# Patient Record
Sex: MALE | Race: WHITE
[De-identification: names, ages, dates, MRNs, and addresses within clinical notes are randomized per-mention and may not be internally consistent; named-entity substitution may affect disease eponyms.]

---

## 2017-04-21 ENCOUNTER — HOSPITAL ENCOUNTER (OUTPATIENT)
Dept: HOSPITAL 39 - GT | Age: 82
Discharge: HOME | End: 2017-04-21
Attending: INTERNAL MEDICINE
Payer: MEDICARE

## 2017-04-21 DIAGNOSIS — M15.0: ICD-10-CM

## 2017-04-21 DIAGNOSIS — I50.9: Primary | ICD-10-CM

## 2017-04-21 DIAGNOSIS — I10: ICD-10-CM

## 2017-04-24 ENCOUNTER — HOSPITAL ENCOUNTER (OUTPATIENT)
Dept: HOSPITAL 39 - GT | Age: 82
End: 2017-04-24
Attending: FAMILY MEDICINE
Payer: MEDICARE

## 2017-04-24 DIAGNOSIS — I48.91: ICD-10-CM

## 2017-04-24 DIAGNOSIS — J44.1: ICD-10-CM

## 2017-04-24 DIAGNOSIS — I10: Primary | ICD-10-CM

## 2017-04-24 DIAGNOSIS — E11.9: ICD-10-CM

## 2017-06-02 ENCOUNTER — HOSPITAL ENCOUNTER (OUTPATIENT)
Dept: HOSPITAL 39 - GT | Age: 82
Discharge: HOME | End: 2017-06-02
Attending: FAMILY MEDICINE
Payer: MEDICARE

## 2017-06-02 DIAGNOSIS — D64.9: ICD-10-CM

## 2017-06-02 DIAGNOSIS — I10: ICD-10-CM

## 2017-06-02 DIAGNOSIS — D51.8: ICD-10-CM

## 2017-06-02 DIAGNOSIS — E11.9: ICD-10-CM

## 2017-06-02 DIAGNOSIS — I48.91: Primary | ICD-10-CM

## 2017-06-02 DIAGNOSIS — E56.8: ICD-10-CM

## 2017-08-02 ENCOUNTER — HOSPITAL ENCOUNTER (OUTPATIENT)
Dept: HOSPITAL 39 - GT | Age: 82
End: 2017-08-02
Attending: FAMILY MEDICINE
Payer: MEDICARE

## 2017-08-02 DIAGNOSIS — E56.9: ICD-10-CM

## 2017-08-02 DIAGNOSIS — D51.9: ICD-10-CM

## 2017-08-02 DIAGNOSIS — I10: Primary | ICD-10-CM

## 2017-08-02 DIAGNOSIS — E11.9: ICD-10-CM

## 2017-08-02 DIAGNOSIS — E56.8: ICD-10-CM

## 2017-08-02 DIAGNOSIS — Z51.81: ICD-10-CM

## 2017-09-20 ENCOUNTER — HOSPITAL ENCOUNTER (OUTPATIENT)
Dept: HOSPITAL 39 - GT | Age: 82
Discharge: HOME | End: 2017-09-20
Attending: FAMILY MEDICINE
Payer: MEDICARE

## 2017-09-20 DIAGNOSIS — I48.91: Primary | ICD-10-CM

## 2017-12-04 ENCOUNTER — HOSPITAL ENCOUNTER (OUTPATIENT)
Dept: HOSPITAL 39 - GT | Age: 82
Discharge: HOME | End: 2017-12-04
Attending: FAMILY MEDICINE
Payer: MEDICARE

## 2017-12-04 DIAGNOSIS — E11.9: ICD-10-CM

## 2017-12-04 DIAGNOSIS — E56.9: ICD-10-CM

## 2017-12-04 DIAGNOSIS — D51.8: ICD-10-CM

## 2017-12-04 DIAGNOSIS — I11.0: Primary | ICD-10-CM

## 2017-12-04 DIAGNOSIS — I50.9: ICD-10-CM

## 2017-12-11 ENCOUNTER — HOSPITAL ENCOUNTER (OUTPATIENT)
Dept: HOSPITAL 39 - GT | Age: 82
Discharge: HOME | End: 2017-12-11
Attending: FAMILY MEDICINE
Payer: MEDICARE

## 2017-12-11 DIAGNOSIS — J44.1: ICD-10-CM

## 2017-12-11 DIAGNOSIS — I10: ICD-10-CM

## 2017-12-11 DIAGNOSIS — E56.8: ICD-10-CM

## 2017-12-11 DIAGNOSIS — I48.91: Primary | ICD-10-CM

## 2017-12-11 DIAGNOSIS — B02.24: ICD-10-CM

## 2017-12-11 DIAGNOSIS — G30.9: ICD-10-CM

## 2017-12-11 DIAGNOSIS — E11.9: ICD-10-CM

## 2018-01-05 ENCOUNTER — HOSPITAL ENCOUNTER (OUTPATIENT)
Dept: HOSPITAL 39 - GT | Age: 83
Discharge: HOME | End: 2018-01-05
Attending: FAMILY MEDICINE
Payer: MEDICARE

## 2018-01-05 DIAGNOSIS — D51.8: Primary | ICD-10-CM

## 2018-01-05 DIAGNOSIS — E56.9: ICD-10-CM

## 2018-01-31 ENCOUNTER — HOSPITAL ENCOUNTER (OUTPATIENT)
Dept: HOSPITAL 39 - GT | Age: 83
End: 2018-01-31
Attending: FAMILY MEDICINE
Payer: MEDICARE

## 2018-01-31 DIAGNOSIS — J18.9: Primary | ICD-10-CM

## 2018-01-31 PROCEDURE — 85025 COMPLETE CBC W/AUTO DIFF WBC: CPT

## 2018-03-21 ENCOUNTER — HOSPITAL ENCOUNTER (OUTPATIENT)
Dept: HOSPITAL 39 - GT | Age: 83
End: 2018-03-21
Attending: FAMILY MEDICINE
Payer: MEDICARE

## 2018-03-21 DIAGNOSIS — I50.9: ICD-10-CM

## 2018-03-21 DIAGNOSIS — I48.91: ICD-10-CM

## 2018-03-21 DIAGNOSIS — I10: Primary | ICD-10-CM

## 2018-03-21 DIAGNOSIS — M10.00: ICD-10-CM

## 2018-03-21 DIAGNOSIS — E56.8: ICD-10-CM

## 2018-03-21 DIAGNOSIS — E11.9: ICD-10-CM

## 2018-03-21 DIAGNOSIS — D51.8: ICD-10-CM

## 2018-03-21 PROCEDURE — 80053 COMPREHEN METABOLIC PANEL: CPT

## 2018-03-21 PROCEDURE — 82248 BILIRUBIN DIRECT: CPT

## 2018-03-21 PROCEDURE — 80162 ASSAY OF DIGOXIN TOTAL: CPT

## 2018-03-21 PROCEDURE — 85025 COMPLETE CBC W/AUTO DIFF WBC: CPT

## 2018-03-21 PROCEDURE — 36415 COLL VENOUS BLD VENIPUNCTURE: CPT

## 2018-03-21 PROCEDURE — 83036 HEMOGLOBIN GLYCOSYLATED A1C: CPT

## 2018-03-21 PROCEDURE — 80061 LIPID PANEL: CPT

## 2018-04-24 ENCOUNTER — HOSPITAL ENCOUNTER (INPATIENT)
Dept: HOSPITAL 39 - MS | Age: 83
LOS: 3 days | Discharge: NURSING, REHAB OR LONG-TERM CARE | DRG: 191 | End: 2018-04-27
Attending: NURSE PRACTITIONER | Admitting: NURSE PRACTITIONER
Payer: MEDICARE

## 2018-04-24 DIAGNOSIS — I44.7: ICD-10-CM

## 2018-04-24 DIAGNOSIS — I48.91: ICD-10-CM

## 2018-04-24 DIAGNOSIS — Z96.651: ICD-10-CM

## 2018-04-24 DIAGNOSIS — Z88.0: ICD-10-CM

## 2018-04-24 DIAGNOSIS — E87.1: ICD-10-CM

## 2018-04-24 DIAGNOSIS — Z88.5: ICD-10-CM

## 2018-04-24 DIAGNOSIS — Z88.2: ICD-10-CM

## 2018-04-24 DIAGNOSIS — G47.30: ICD-10-CM

## 2018-04-24 DIAGNOSIS — M10.9: ICD-10-CM

## 2018-04-24 DIAGNOSIS — Z88.8: ICD-10-CM

## 2018-04-24 DIAGNOSIS — Z85.9: ICD-10-CM

## 2018-04-24 DIAGNOSIS — Z79.4: ICD-10-CM

## 2018-04-24 DIAGNOSIS — I13.10: ICD-10-CM

## 2018-04-24 DIAGNOSIS — E87.5: ICD-10-CM

## 2018-04-24 DIAGNOSIS — N17.9: ICD-10-CM

## 2018-04-24 DIAGNOSIS — Z79.84: ICD-10-CM

## 2018-04-24 DIAGNOSIS — E11.22: ICD-10-CM

## 2018-04-24 DIAGNOSIS — N18.9: ICD-10-CM

## 2018-04-24 DIAGNOSIS — J44.1: Primary | ICD-10-CM

## 2018-04-24 RX ADMIN — METFORMIN HYDROCHLORIDE SCH MG: 500 TABLET, EXTENDED RELEASE ORAL at 21:08

## 2018-04-24 RX ADMIN — INSULIN LISPRO SCH UNITS: 100 INJECTION, SOLUTION INTRAVENOUS; SUBCUTANEOUS at 16:27

## 2018-04-24 RX ADMIN — INSULIN LISPRO SCH UNITS: 100 INJECTION, SOLUTION INTRAVENOUS; SUBCUTANEOUS at 21:09

## 2018-04-24 RX ADMIN — IPRATROPIUM BROMIDE AND ALBUTEROL SULFATE SCH: .5; 3 SOLUTION RESPIRATORY (INHALATION) at 13:50

## 2018-04-24 RX ADMIN — Medication PRN MLS/HR: at 19:35

## 2018-04-24 RX ADMIN — METHYLPREDNISOLONE SODIUM SUCCINATE SCH MG: 40 INJECTION, POWDER, FOR SOLUTION INTRAMUSCULAR; INTRAVENOUS at 23:49

## 2018-04-24 RX ADMIN — INSULIN LISPRO SCH UNITS: 100 INJECTION, SOLUTION INTRAVENOUS; SUBCUTANEOUS at 12:06

## 2018-04-24 RX ADMIN — OMEPRAZOLE SCH MG: 20 CAPSULE, DELAYED RELEASE ORAL at 16:29

## 2018-04-24 RX ADMIN — METHYLPREDNISOLONE SODIUM SUCCINATE SCH MG: 40 INJECTION, POWDER, FOR SOLUTION INTRAMUSCULAR; INTRAVENOUS at 17:31

## 2018-04-24 RX ADMIN — IPRATROPIUM BROMIDE AND ALBUTEROL SULFATE SCH ML: .5; 3 SOLUTION RESPIRATORY (INHALATION) at 15:57

## 2018-04-24 RX ADMIN — IPRATROPIUM BROMIDE AND ALBUTEROL SULFATE SCH ML: .5; 3 SOLUTION RESPIRATORY (INHALATION) at 20:09

## 2018-04-24 NOTE — HP
SUPERVISING PHYSICIAN:  Danny Wood MD



CHIEF COMPLAINT: Shortness of breath.



HISTORY OF PRESENT ILLNESS: This is an 86-year-old male resident of a nursing 
home who came with shortness of breath.  Apparently they had been treating him 
for a chronic obstructive pulmonary disease exacerbation on an outpatient basis 
for a couple of weeks.  He was being treated with Zithromax and Keflex.  He 
failed to really improve and in the office today was significantly short of 
breath and unable to speak in full sentences.  Therefore, Dr. Ni contacted 
me for direct admission.  Currently, the patient is pretty short of breath at 
rest.  He is diminished with bilateral wheezing.  He has rhonchi noted as well.
  O2 saturation is about 92% on room air.  



PAST MEDICAL HISTORY: 

1.  Chronic obstructive pulmonary disease.

2.  Atrial fibrillation.

3.  Sleep apnea.

4.  Hypertension.

5.  Left bundle branch block.

6.  Cor pulmonale.

7.  Diverticulosis.

8.  Renal insufficiency.

9.  Gout.

10. Diabetes mellitus, type 2.

11. Herpes zoster.



PAST SURGICAL HISTORY:

1.  Right inguinal hernia repair.

2.  Right total knee arthroplasty.

3.  Basal cell carcinoma excision.

4.  Colonoscopy with polypectomies.  



CURRENT MEDICATIONS:

1.  Multivitamin 1 tab daily.

2.  Vitamin D 1000 units p.o. daily.

3.  Vitamin B12 1000 mcg sublingual daily.

4.  Calcium plus D 1 tab p.o. daily.

5.  Anoro Ellipta 62.5/25 1 inhalation daily.

6.  Spironolactone 25 mg daily.

7.  Lasix 40 mg at 12 noon

8.  Losartan 25 mg daily.

9.  Glipizide 10 mg p.o. b.i.d.

10. Digoxin 125 mcg p.o. daily.

11. Allopurinol 100 mg p.o. daily.

12. Omeprazole 20 mg p.o. b.i.d..

13. Lexapro 5 mg p.o. daily.

14. DuoNeb 3 mL per nebulizer q.i.d. p.r.n.

15. Hydroxyzine 1 to 2 tabs p.o. at bedtime.

16. Metformin 500 mg t.i.d.



ALLERGIES:  ATORVASTATIN, CODEINE, LISINOPRIL, MAGNESIUM, PENICILLIN, 
SIMVASTATIN, SULFA.



FAMILY HISTORY: Father  at age 94.  Mother had Alzheimer's disease.



SOCIAL HISTORY:  No alcohol, no illicit drugs, but does smoke.



REVIEW OF SYSTEMS: 

CONSTITUTIONAL: No fever or chills.  No recent weight loss or weight gain.  
Positive for malaise.

HEENT:  No headaches, vision changes, ear pain, nasal congestion or throat 
pain.  

RESPIRATORY:  Positive for cough.  No hemoptysis.  Positive for dyspnea.  No 
pleuritic chest pain.

CARDIOVASCULAR:  No chest pain, palpitations or peripheral edema.

GASTROINTESTINAL:  No nausea, vomiting, diarrhea, constipation or abdominal 
pain.

GENITOURINARY:  No dysuria, frequency or flank pain.

MUSCULOSKELETAL:  No muscle cramps, joint pain or joint swelling.

ENDOCRINE:  No polydipsia, polyuria, polyphagia.  No heat or cold intolerance.

NEUROLOGIC:  No syncope, paresthesias, seizures.  



PHYSICAL EXAMINATION: 



VITAL SIGNS:  Blood pressure 108/68.  Heart rate 96.  Respiratory rate 22.  
Temperature 98.1.  Oxygen saturation 93%.



GENERAL:  Mr. Salvador is an 86-year-old male patient in obvious respiratory 
distress currently.



HEENT:  Normocephalic, atraumatic.  Pupils are equal and reactive.  No nasal 
drainage.  Throat with dry mucosa.



NECK:  Supple.  Midline trachea.  No jugular venous distention.



CHEST:  Symmetrical with equal rise and fall of the chest with inspiration and 
expiration.  Lung sounds are diminished with bilateral wheezing and scattered 
rhonchi.



CARDIOVASCULAR:  Slightly irregular rhythm.  Normal S1, S2.



ABDOMEN:  Soft.  Positive bowel sounds.  



GENITOURINARY:  Deferred.



EXTREMITIES:  Lower extremities with no edema.  



NEUROLOGIC: The patient is alert.



LABORATORY:  White count 8.6, hemoglobin 12.5, hematocrit 37.5, platelet count 
187.  Sodium 135, potassium 4.9, chloride 101, CO2 23, BUN 44, creatinine 1.8, 
glucose 256, calcium 9.2.  .  Chest x-ray done at the clinic did not 
show any pneumonia.



ASSESSMENT:

1.  Chronic obstructive pulmonary disease exacerbation which has failed 
outpatient

     therapy.

2.  Hypertension, currently controlled.

3.  Diabetes mellitus, type 2.

4.  History of atrial fibrillation with currently controlled rate.  



PLAN:  We will utilize empiric antibiotics as well as IV steroids and nebulizer 
therapies.  He did not appear to improve well with azithromycin and Keflex p.o. 
as an outpatient.  We will utilize Lovenox for DVT prophylaxis as well as 
omeprazole for GI ulcer prophylaxis at this point as well.  If he improves over 
the next 24 hours, he may be able to go back to the nursing home tomorrow 
depending on his clinical picture at that time.  We will repeat labs as well as 
chest x-ray tomorrow as well.



#[id number]
ANNI

## 2018-04-25 RX ADMIN — METFORMIN HYDROCHLORIDE SCH: 500 TABLET, EXTENDED RELEASE ORAL at 09:29

## 2018-04-25 RX ADMIN — IPRATROPIUM BROMIDE AND ALBUTEROL SULFATE SCH ML: .5; 3 SOLUTION RESPIRATORY (INHALATION) at 13:05

## 2018-04-25 RX ADMIN — METHYLPREDNISOLONE SODIUM SUCCINATE SCH MG: 40 INJECTION, POWDER, FOR SOLUTION INTRAMUSCULAR; INTRAVENOUS at 05:32

## 2018-04-25 RX ADMIN — LOSARTAN POTASSIUM SCH MG: 25 TABLET ORAL at 08:39

## 2018-04-25 RX ADMIN — DIGOXIN SCH MG: 125 TABLET ORAL at 12:47

## 2018-04-25 RX ADMIN — OMEPRAZOLE SCH MG: 20 CAPSULE, DELAYED RELEASE ORAL at 06:24

## 2018-04-25 RX ADMIN — METHYLPREDNISOLONE SODIUM SUCCINATE SCH MG: 40 INJECTION, POWDER, FOR SOLUTION INTRAMUSCULAR; INTRAVENOUS at 12:47

## 2018-04-25 RX ADMIN — METFORMIN HYDROCHLORIDE SCH MG: 500 TABLET, EXTENDED RELEASE ORAL at 12:47

## 2018-04-25 RX ADMIN — CYANOCOBALAMIN TAB 1000 MCG SCH MCG: 1000 TAB at 08:41

## 2018-04-25 RX ADMIN — Medication PRN MLS/HR: at 16:47

## 2018-04-25 RX ADMIN — METHYLPREDNISOLONE SODIUM SUCCINATE SCH MG: 40 INJECTION, POWDER, FOR SOLUTION INTRAMUSCULAR; INTRAVENOUS at 17:00

## 2018-04-25 RX ADMIN — IPRATROPIUM BROMIDE AND ALBUTEROL SULFATE SCH ML: .5; 3 SOLUTION RESPIRATORY (INHALATION) at 00:05

## 2018-04-25 RX ADMIN — Medication SCH ML: at 21:24

## 2018-04-25 RX ADMIN — IPRATROPIUM BROMIDE AND ALBUTEROL SULFATE SCH ML: .5; 3 SOLUTION RESPIRATORY (INHALATION) at 08:35

## 2018-04-25 RX ADMIN — IPRATROPIUM BROMIDE AND ALBUTEROL SULFATE SCH ML: .5; 3 SOLUTION RESPIRATORY (INHALATION) at 05:01

## 2018-04-25 RX ADMIN — INSULIN LISPRO SCH UNITS: 100 INJECTION, SOLUTION INTRAVENOUS; SUBCUTANEOUS at 07:12

## 2018-04-25 RX ADMIN — Medication PRN MLS/HR: at 05:22

## 2018-04-25 RX ADMIN — SPIRONOLACTONE SCH MG: 25 TABLET, FILM COATED ORAL at 08:39

## 2018-04-25 RX ADMIN — CHOLECALCIFEROL TAB 50 MCG (2000 UNIT) SCH IU: 50 TAB at 08:41

## 2018-04-25 RX ADMIN — ALLOPURINOL SCH MG: 100 TABLET ORAL at 08:42

## 2018-04-25 RX ADMIN — INSULIN LISPRO SCH UNITS: 100 INJECTION, SOLUTION INTRAVENOUS; SUBCUTANEOUS at 16:52

## 2018-04-25 RX ADMIN — INSULIN LISPRO SCH UNITS: 100 INJECTION, SOLUTION INTRAVENOUS; SUBCUTANEOUS at 21:24

## 2018-04-25 RX ADMIN — OMEPRAZOLE SCH MG: 20 CAPSULE, DELAYED RELEASE ORAL at 16:50

## 2018-04-25 RX ADMIN — METFORMIN HYDROCHLORIDE SCH MG: 500 TABLET, EXTENDED RELEASE ORAL at 09:29

## 2018-04-25 RX ADMIN — LEVOFLOXACIN SCH MLS/HR: 250 INJECTION, SOLUTION INTRAVENOUS at 08:46

## 2018-04-25 RX ADMIN — CALCIUM CARBONATE-VITAMIN D TAB 500 MG-200 UNIT SCH EA: 500-200 TAB at 08:39

## 2018-04-25 RX ADMIN — INSULIN LISPRO SCH UNITS: 100 INJECTION, SOLUTION INTRAVENOUS; SUBCUTANEOUS at 11:48

## 2018-04-25 RX ADMIN — IPRATROPIUM BROMIDE AND ALBUTEROL SULFATE SCH ML: .5; 3 SOLUTION RESPIRATORY (INHALATION) at 16:15

## 2018-04-25 RX ADMIN — Medication SCH EA: at 08:39

## 2018-04-25 RX ADMIN — ESCITALOPRAM OXALATE SCH MG: 10 TABLET ORAL at 08:39

## 2018-04-25 RX ADMIN — METFORMIN HYDROCHLORIDE SCH MG: 500 TABLET, EXTENDED RELEASE ORAL at 16:50

## 2018-04-25 NOTE — RAD
Procedure:  XR CHEST 1 VIEW        



Exam Date:  4/25/2018



Ordering Provider:  Cheng Valenzuela



Clinical Indication:  copd exac



Comparison: 4/18/2018



Findings: 

Cardiomediastinal silhouette: Cardiac size magnified by

technique.

Pulmonary vasculature : Prominent centrally without taiwo edema.

Focal lung consolidation: No focal lung consolidation. Lungs are

hyperinflated.

Pleural effusion: None

Pneumothorax: None

Bones and soft tissues: Nonacute



Impression: 

1. No acute abnormalities in the chest.



Electronically signed by:  Parviz Velasco MD  4/25/2018 7:15 AM CDT

Workstation: [id number]

## 2018-04-26 RX ADMIN — Medication SCH ML: at 08:46

## 2018-04-26 RX ADMIN — METFORMIN HYDROCHLORIDE SCH MG: 500 TABLET, EXTENDED RELEASE ORAL at 07:40

## 2018-04-26 RX ADMIN — LEVALBUTEROL SCH MG: 1.25 SOLUTION RESPIRATORY (INHALATION) at 08:42

## 2018-04-26 RX ADMIN — INSULIN LISPRO SCH UNITS: 100 INJECTION, SOLUTION INTRAVENOUS; SUBCUTANEOUS at 16:56

## 2018-04-26 RX ADMIN — LEVOFLOXACIN SCH MLS/HR: 250 INJECTION, SOLUTION INTRAVENOUS at 08:41

## 2018-04-26 RX ADMIN — INSULIN LISPRO SCH UNITS: 100 INJECTION, SOLUTION INTRAVENOUS; SUBCUTANEOUS at 21:06

## 2018-04-26 RX ADMIN — INSULIN LISPRO SCH UNITS: 100 INJECTION, SOLUTION INTRAVENOUS; SUBCUTANEOUS at 11:55

## 2018-04-26 RX ADMIN — CYANOCOBALAMIN TAB 1000 MCG SCH MCG: 1000 TAB at 08:45

## 2018-04-26 RX ADMIN — OMEPRAZOLE SCH MG: 20 CAPSULE, DELAYED RELEASE ORAL at 06:30

## 2018-04-26 RX ADMIN — SPIRONOLACTONE SCH MG: 25 TABLET, FILM COATED ORAL at 08:42

## 2018-04-26 RX ADMIN — Medication SCH ML: at 21:06

## 2018-04-26 RX ADMIN — CHOLECALCIFEROL TAB 50 MCG (2000 UNIT) SCH IU: 50 TAB at 08:42

## 2018-04-26 RX ADMIN — LEVALBUTEROL SCH MG: 1.25 SOLUTION RESPIRATORY (INHALATION) at 11:52

## 2018-04-26 RX ADMIN — LEVALBUTEROL SCH MG: 1.25 SOLUTION RESPIRATORY (INHALATION) at 20:37

## 2018-04-26 RX ADMIN — OMEPRAZOLE SCH MG: 20 CAPSULE, DELAYED RELEASE ORAL at 16:59

## 2018-04-26 RX ADMIN — DIGOXIN SCH MG: 125 TABLET ORAL at 11:55

## 2018-04-26 RX ADMIN — ALLOPURINOL SCH MG: 100 TABLET ORAL at 08:45

## 2018-04-26 RX ADMIN — LEVALBUTEROL SCH MG: 1.25 SOLUTION RESPIRATORY (INHALATION) at 16:28

## 2018-04-26 RX ADMIN — Medication SCH EA: at 08:42

## 2018-04-26 RX ADMIN — ESCITALOPRAM OXALATE SCH MG: 10 TABLET ORAL at 08:41

## 2018-04-26 RX ADMIN — INSULIN LISPRO SCH UNITS: 100 INJECTION, SOLUTION INTRAVENOUS; SUBCUTANEOUS at 07:40

## 2018-04-26 RX ADMIN — LOSARTAN POTASSIUM SCH MG: 25 TABLET ORAL at 08:45

## 2018-04-26 RX ADMIN — CALCIUM CARBONATE-VITAMIN D TAB 500 MG-200 UNIT SCH EA: 500-200 TAB at 08:47

## 2018-04-26 NOTE — PN
DATE:  04/25/18



SUPERVISING PHYSICIAN:  Danny Wood M.D.



SUBJECTIVE:  The patient is sitting up in his bed in his hospital room.  He is 
eating his meal.  Complains of shortness of breath but is much improved since 
yesterday.  Denies any chest pain, nausea, vomiting, diarrhea or constipation.  
Still feels quite weak.  



OBJECTIVE:  VITAL SIGNS: He is afebrile, heart rate has been 102 to 130.  Blood 
pressure 91/52, respiratory rate 23, O2 sat has been as low as 89% on room air 
and is now 92% on room air.  RESPIRATORY: A few rhonchi scattered throughout 
all lung fields.  There is no expiratory wheezing.  Somewhat diminished at the 
bases.  CARDIAC: Tachycardic rate, regular rhythm.  GASTROINTESTINAL: Abdomen 
is soft, nondistended, non-tender.  Bowel sounds are positive.  NEUROLOGIC: He 
is awake, alert and oriented times three.



LABORATORY:  Sodium is slightly low at 134 with a slightly elevated potassium 
at 5.3, chloride 104, carbon dioxide 20, BUN 45, creatinine 1.92.  Glucose has 
run between 271 and 333.  Preliminary blood cultures show no growth after 24 
hours.  



Chest x-ray per radiology interpretation showed no acute abnormalities in the 
chest.  All other labs and films have been reviewed via the EMR.



ASSESSMENT: 

1.   Chronic obstructive pulmonary disease with acute exacerbation.  He has 
failed

      outpatient therapy.

2.   Hypertension currently controlled.

3.   Acute on chronic kidney disease.  His baseline creatinine is about 1.5.

4.  Diabetes mellitus, type 2.

5.  History of atrial fibrillation.



PLAN:  We will continue present supportive care.  Will need to closely watch 
his heart rate.  It has been elevated to the 120s today, but his family has 
said that his heart rate always goes up when he is on  IV steroids.  His IV 
steroids have been discontinued and he is now on a p.o. dose of prednisone.  I 
have discontinued his fluids.  We will have to closely watch his potassium, it 
is slightly elevated as well as his kidney functions.  I will reorder some labs 
for in the morning.  The patient has been encouraged to wear his oxygen if 
needed, although he usually does not have it on.  We will monitor him closely 
and follow as needed.  



#[id number]
[facility]

## 2018-04-26 NOTE — PN
DATE:  04/26/18



SUPERVISING PHYSICIAN:  Danny Wood M.D.



SUBJECTIVE:  The patient is lying in bed.  He is visiting with his family.  Has 
no complaints of shortness of breath, nausea, vomiting or diarrhea.  Complains 
of some difficulty speaking but he does have his appetite back.  He is rather 
hoarse.



OBJECTIVE:  He is afebrile, heart rate 73, blood pressure 107/64, respiratory 
rate 18, O2 sat 95% on room air.  RESPIRATORY:  Essentially clear to 
auscultation bilaterally.  Somewhat diminished especially at the bases.  CARDIAC
: Regular rate and rhythm.  GASTROINTESTINAL: Abdomen is soft, nondistended, non
-tender.  Bowel sounds are positive.  NEUROLOGIC: He is awake, alert and 
oriented times three.



LABORATORY:  WBCs are 11.3 with hemoglobin 11.3 and hematocrit 34.3, 
neutrophils 92.5%.  Sodium 138, potassium 5.7, chloride 108, carbon dioxide 18, 
BUN 54, creatinine 2.02.  Random glucose 280.  Preliminary blood cultures show 
no growth after 48 hours.  All other labs and films have been reviewed via the 
EMR.



ASSESSMENT: 

1.   Chronic obstructive pulmonary disease with acute exacerbation, failed 
outpatient 

      therapy.

2.   Hypertension currently controlled.

3.   Acute on chronic kidney disease.  His baseline creatinine is about 1.5.

4.   Diabetes mellitus, type 2.

5.   History of atrial fibrillation.



PLAN:  We will continue present supportive care.  His heart rate is much better 
overnight as we have decreased his steroids.  Will continue to monitor that 
closely.  His kidney function is slightly worsened today as his potassium is 
slightly higher.  Will repeat lab in the morning and monitor that closely.  
Continue to encourage good pulmonary hygiene.  I will also consult Physical 
Therapy.  Will continue to monitor the patient closely and follow as needed.  
Dr. Wood is the collaborating physician available for consultation.



#[id number]
[facility]

## 2018-04-27 VITALS — SYSTOLIC BLOOD PRESSURE: 107 MMHG | DIASTOLIC BLOOD PRESSURE: 67 MMHG | TEMPERATURE: 98.2 F

## 2018-04-27 VITALS — OXYGEN SATURATION: 91 %

## 2018-04-27 RX ADMIN — LEVALBUTEROL SCH MG: 1.25 SOLUTION RESPIRATORY (INHALATION) at 15:23

## 2018-04-27 RX ADMIN — CYANOCOBALAMIN TAB 1000 MCG SCH MCG: 1000 TAB at 08:46

## 2018-04-27 RX ADMIN — LEVOFLOXACIN SCH MLS/HR: 250 INJECTION, SOLUTION INTRAVENOUS at 08:44

## 2018-04-27 RX ADMIN — DIGOXIN SCH MG: 125 TABLET ORAL at 12:10

## 2018-04-27 RX ADMIN — LEVALBUTEROL SCH MG: 1.25 SOLUTION RESPIRATORY (INHALATION) at 11:52

## 2018-04-27 RX ADMIN — CHOLECALCIFEROL TAB 50 MCG (2000 UNIT) SCH IU: 50 TAB at 08:45

## 2018-04-27 RX ADMIN — ALLOPURINOL SCH MG: 100 TABLET ORAL at 08:44

## 2018-04-27 RX ADMIN — Medication SCH ML: at 08:48

## 2018-04-27 RX ADMIN — OMEPRAZOLE SCH MG: 20 CAPSULE, DELAYED RELEASE ORAL at 16:26

## 2018-04-27 RX ADMIN — INSULIN LISPRO SCH UNITS: 100 INJECTION, SOLUTION INTRAVENOUS; SUBCUTANEOUS at 07:42

## 2018-04-27 RX ADMIN — INSULIN LISPRO SCH UNITS: 100 INJECTION, SOLUTION INTRAVENOUS; SUBCUTANEOUS at 16:30

## 2018-04-27 RX ADMIN — OMEPRAZOLE SCH MG: 20 CAPSULE, DELAYED RELEASE ORAL at 06:25

## 2018-04-27 RX ADMIN — ESCITALOPRAM OXALATE SCH MG: 10 TABLET ORAL at 08:47

## 2018-04-27 RX ADMIN — LOSARTAN POTASSIUM SCH MG: 25 TABLET ORAL at 08:46

## 2018-04-27 RX ADMIN — LEVALBUTEROL SCH MG: 1.25 SOLUTION RESPIRATORY (INHALATION) at 08:24

## 2018-04-27 RX ADMIN — INSULIN LISPRO SCH UNITS: 100 INJECTION, SOLUTION INTRAVENOUS; SUBCUTANEOUS at 12:12

## 2018-04-27 RX ADMIN — CALCIUM CARBONATE-VITAMIN D TAB 500 MG-200 UNIT SCH EA: 500-200 TAB at 08:46

## 2018-04-27 RX ADMIN — SPIRONOLACTONE SCH MG: 25 TABLET, FILM COATED ORAL at 08:46

## 2018-04-27 RX ADMIN — Medication SCH EA: at 08:47

## 2018-04-28 NOTE — DS
SUPERVISING PHYSICIAN:  Danny Wood M.D.



DISCHARGE DIAGNOSIS: 

1.   Chronic obstructive pulmonary disease with acute exacerbation, failed 
outpatient 

      therapy.

2.   Hypertension currently controlled.

3.   Acute on chronic kidney disease.  His baseline creatinine is about 1.5.

4.   Diabetes mellitus, type 2.

5.   History of atrial fibrillation.



HISTORY OF PRESENT ILLNESS: This is an 86-year-old male patient who is a 
resident of [facility], who came in with shortness of breath.  Dr. Ni, 
his primary care physician, had been treating his COPD as an outpatient basis 
for several weeks.  He had being treated with Zithromax and Keflex.  He failed 
to improve and was a direct admission from Dr. Ni' office to the hospital.  



HOSPITAL COURSE:  On admission, he was very shortness of breath.  He was 
diminished with bilateral wheezing, has rhonchi noted as well and saturation of 
92% on room air.  He was started on IV steroids as well as Levaquin.  His IV 
steroids were titrated down fairly quickly as his family said that IV steroids 
tend to make him tachycardic.  For about 24 hours his heart rate was in the 
110s to low 130s.  After titrating his steroids down his heart rate did come 
down to around 100.  He has been on p.o. steroids for the last 24 hours and his 
pulse rate is down to the 80s.  His H&H was stable.  His WBCs did get as high 
at 11.3 but may have been due to his steroid therapy.  Today, it is 10.  He did 
have a left shift through his entire stay and today his neutrophils are 87.9%.  
He was hyponatremic several times and today his sodium is 132.  Potassium was 
slightly elevated.  It was as high as 5.7, today it is 5.3 but that may be due 
to his chronic kidney disease.  Creatinine baseline is about 1.5 and today it 
is 1.74.  His creatinine was as high as 2.02 but he received some IV fluids and 
that has improved.  His preliminary blood culture showed no growth after 3 days 
and his chest x-ray showed no acute abnormalities of the chest.  The patient is 
much improved and he will be discharged to [facility] today.  



DISCHARGE PLAN:  The patient will be discharged back to [facility] today.  
He is in good condition.  He is to increase his activity as tolerated.  He has 
a followup appointment with his primary care physician Dr. Ni, on 05/08/18 
at 8:30 AM.  He is to get a BMP on arrival due to his hyponatremia and 
hyperkalemia.  I have discharged him on Levaquin and a prednisone taper.  There 
was also a question that the patient may have been allergic to Levaquin, but he 
has tolerated his Levaquin without any issues.  He was also on long-acting 
insulin at the nursing home but his dosing was never clarified while in the 
hospital.  He will be discharged on his usual medications.  The nursing home 
called that his Levemir had inadvertently been left off of his medication list, 
but it is to be resumed at the nursing home at his previous dosing which I 
believe is 6 units of long-acting insulin daily.  I have also given him a 
routine sliding scale insulin a.c. and h.s.  If he has any further problems or 
complications, he is to return to the hospital or call Dr. Ni' office.  



DISCHARGE MEDICATIONS:

1.   Furosemide.

2.   Allopurinol.

3.   Cyanocobalamin.

4.   Multivitamins.

5.   Calcium and Vitamin D.

6.   Anoro ellipta.

7.   Spironolactone.

8.   Losartan.

9.   Glipizide.

10. Digoxin.

11. Omeprazole.

12. Lexapro.

13. DuoNeb.

14. Hydroxyzine.

15. Metformin.

16. Vitamin D.

17. Levaquin.

18. Prednisone.

19. Long-acting insulin.

20. Humalog insulin sliding scale a.c. and h.s.



Dr. Wood is the collaborating physician available for consultation.



#[id number]
[facility]

## 2018-05-02 ENCOUNTER — HOSPITAL ENCOUNTER (OUTPATIENT)
Dept: HOSPITAL 39 - GMAL | Age: 83
End: 2018-05-02
Attending: FAMILY MEDICINE
Payer: MEDICARE

## 2018-05-02 DIAGNOSIS — I50.9: ICD-10-CM

## 2018-05-02 DIAGNOSIS — I10: Primary | ICD-10-CM

## 2018-05-07 ENCOUNTER — HOSPITAL ENCOUNTER (OUTPATIENT)
Dept: HOSPITAL 39 - GT | Age: 83
End: 2018-05-07
Attending: FAMILY MEDICINE
Payer: MEDICARE

## 2018-05-07 DIAGNOSIS — I50.9: ICD-10-CM

## 2018-05-07 DIAGNOSIS — I10: ICD-10-CM

## 2018-05-07 DIAGNOSIS — E11.9: Primary | ICD-10-CM

## 2018-10-02 ENCOUNTER — HOSPITAL ENCOUNTER (OUTPATIENT)
Dept: HOSPITAL 39 - GT | Age: 83
End: 2018-10-02
Attending: PHYSICIAN ASSISTANT
Payer: MEDICARE

## 2018-10-02 DIAGNOSIS — L02.92: Primary | ICD-10-CM

## 2019-01-09 ENCOUNTER — HOSPITAL ENCOUNTER (INPATIENT)
Dept: HOSPITAL 39 - ER | Age: 84
LOS: 4 days | DRG: 871 | End: 2019-01-13
Attending: NURSE PRACTITIONER | Admitting: NURSE PRACTITIONER
Payer: MEDICARE

## 2019-01-09 DIAGNOSIS — Z88.0: ICD-10-CM
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
## 2019-01-09 DIAGNOSIS — Z79.84: ICD-10-CM

## 2019-01-09 DIAGNOSIS — Z88.2: ICD-10-CM

## 2019-01-09 DIAGNOSIS — Z88.8: ICD-10-CM

## 2019-01-09 DIAGNOSIS — Z88.5: ICD-10-CM

## 2019-01-09 DIAGNOSIS — J18.9: ICD-10-CM

## 2019-01-09 DIAGNOSIS — N17.9: ICD-10-CM

## 2019-01-09 DIAGNOSIS — G47.33: ICD-10-CM

## 2019-01-09 DIAGNOSIS — Z66: ICD-10-CM

## 2019-01-09 DIAGNOSIS — Z96.651: ICD-10-CM

## 2019-01-09 DIAGNOSIS — N18.9: ICD-10-CM

## 2019-01-09 DIAGNOSIS — J44.1: ICD-10-CM

## 2019-01-09 DIAGNOSIS — B37.2: ICD-10-CM

## 2019-01-09 DIAGNOSIS — I48.91: ICD-10-CM

## 2019-01-09 DIAGNOSIS — M1A.9XX0: ICD-10-CM

## 2019-01-09 DIAGNOSIS — E87.5: ICD-10-CM

## 2019-01-09 DIAGNOSIS — I12.9: ICD-10-CM

## 2019-01-09 DIAGNOSIS — E87.1: ICD-10-CM

## 2019-01-09 DIAGNOSIS — E11.22: ICD-10-CM

## 2019-01-09 DIAGNOSIS — A41.9: Primary | ICD-10-CM

## 2019-01-09 DIAGNOSIS — Z87.891: ICD-10-CM

## 2019-01-09 DIAGNOSIS — J44.0: ICD-10-CM

## 2019-01-09 DIAGNOSIS — R65.20: ICD-10-CM

## 2019-01-09 DIAGNOSIS — E86.0: ICD-10-CM

## 2019-01-09 RX ADMIN — IPRATROPIUM BROMIDE AND ALBUTEROL SULFATE SCH ML: .5; 3 SOLUTION RESPIRATORY (INHALATION) at 20:41

## 2019-01-09 RX ADMIN — Medication PRN MLS/HR: at 14:48

## 2019-01-09 RX ADMIN — IPRATROPIUM BROMIDE AND ALBUTEROL SULFATE SCH ML: .5; 3 SOLUTION RESPIRATORY (INHALATION) at 16:05

## 2019-01-09 RX ADMIN — NYSTATIN SCH APPLIC: 100000 POWDER TOPICAL at 20:51

## 2019-01-09 RX ADMIN — SODIUM CHLORIDE SCH MLS/HR: 9 INJECTION, SOLUTION INTRAVENOUS at 23:15

## 2019-01-09 RX ADMIN — NYSTATIN SCH: 100000 POWDER TOPICAL at 20:51

## 2019-01-09 RX ADMIN — INSULIN LISPRO SCH UNITS: 100 INJECTION, SOLUTION INTRAVENOUS; SUBCUTANEOUS at 20:50

## 2019-01-09 RX ADMIN — ENOXAPARIN SODIUM SCH MG: 30 INJECTION, SOLUTION INTRAVENOUS; SUBCUTANEOUS at 20:50

## 2019-01-09 NOTE — ED.PDOC
History of Present Illness





- General


Chief Complaint: Respiratory Problem


Time Seen by Provider: 19 09:31


Source: patient


Exam Limitations: no limitations


Additional Information: 





PT PRESENTS AFTER FALL LAST PM. DID HIT HEAD NO LOC. REFUSED TO COME TO ER. PT 

HAS HAD PROGRESSIVE WEAKNESS, COUGH, AND LOW GRADE FEVER MUCH WORSE SINCE 

YESTERDAY. 





- History of Present Illness


Timing/Duration: unsure


Severity: moderate


Improving Factors: nothing


Worsening Factors: nothing


Associated Symptoms: cough


Allergies/Adverse Reactions: 


Allergies





Atorvastatin [From Lipitor] Allergy (Verified 18 11:10)


   


Codeine Allergy (Verified 18 11:10)


   


Lisinopril Allergy (Verified 18 11:10)


   


Magnesium Sulfate Allergy (Verified 18 11:10)


   


Penicillins Allergy (Verified 18 11:10)


   


Simvastatin [From Zocor] Allergy (Verified 18 11:10)


   


Sulfa Antibiotics Allergy (Verified 18 11:10)


   








Home Medications: 


Ambulatory Orders





Allopurinol 100 mg PO DAILY 04/15/14 


Furosemide [Lasix] 40 mg PO DAILY@1200 04/15/14 


Calcium Citrate-Vitamin D [Calcium Citrate + D 315-200 mg-Unit] 1 tab PO DAILY 

18 


Cholecalciferol [Vitamin D] 1,000 unit PO DAILY 18 


Cyanocobalamin [Vitamin B-12] 1,000 mcg SL DAILY 18 


Digoxin [Digox] 125 mcg PO DAILY 18 


Escitalopram Oxalate [Lexapro] 5 mg PO DAILY 18 


Glipizide 10 mg PO BID 18 


Hydroxyzine HCl 1 - 2 tablet PO BEDTIME 18 


Ipratropium/Albuterol [Duoneb] 3 ml NEB QID PRN 18 


Losartan Potassium [Cozaar] 25 mg PO DAILY 18 


Metformin HCl [Metformin HCl ER] 500 mg PO TID 18 


Multiple Vitamins W/ Minerals [Abdek] 1 cap PO DAILY 18 


Omeprazole 20 mg PO BID 18 


Spironolactone [Aldactone] 25 mg PO DAILY 18 


Umeclidinium-Vilanterol [Anoro Ellipta 62.5-25 Mcg/INH] 1 aer IN DAILY 18 


Levofloxacin [Levaquin] 250 mg PO DAILY #6 tablet 18 


Prednisone See Taper PO DAILY #30 tab 18 











Review of Systems





- Review of Systems


Constitutional: States: fever.  Denies: chills


EENTM: States: no symptoms reported


Respiratory: States: cough, short of breath


Cardiology: Denies: chest pain, palpitations


Gastrointestinal/Abdominal: Denies: abdominal pain, nausea, vomiting


Genitourinary: States: no symptoms reported


Musculoskeletal: States: no symptoms reported


Skin: States: no symptoms reported


Neurological: Denies: paresthesia, weakness


Endocrine: States: no symptoms reported


Hematologic/Lymphatic: States: no symptoms reported





Past Medical History (General)





- Patient Medical History


Hx Seizures: No


Hx Stroke: No


Hx Asthma: No


Hx of COPD: Yes


Hx Cardiac Disorders: Yes - A-Fib


Hx Congestive Heart Failure: No


Hx Pacemaker: No


Hx Hypertension: Yes


Hx Diabetes: Yes


Hx Cancer: No


Hx MRSA: No





- Vaccination History


Hx Tetanus, Diphtheria Vaccination: No


Hx Influenza Vaccination: Yes


Hx Pneumococcal Vaccination: Yes





- Social History


Hx Tobacco Use: Yes


Hx Alcohol Use: No


Hx Substance Use: No


Hx Physical Abuse: No


Hx Emotional Abuse: No





- Triage Comment


ED Triage Comment: EMS states that patient has breathing difficulty and 

shortness of breath that started last night. Refused medications and transport 

to hospital last night. Patient fell at nursing home. Patient is running a temp 

and has productive cough.





Family Medical History





- Family History


  ** Father


Family History: No Known


Living Status: 





Physical Exam





- Physical Exam


General Appearance: Alert, Other - MILD RESP DISTRESS


Eye Exam: bilateral normal


Ears, Nose, Throat: hearing grossly normal, normal ENT inspection, other - DRY 

MUCOUS MEMBRANES


Neck: non-tender, full range of motion, supple


Respiratory: normal breath sounds, other - FEW SCATTERED RALES, NO WHEEZES, NO 

RHONCHI


Cardiovascular/Chest: tachycardia, irregularly irregular


Gastrointestinal/Abdominal: non tender, soft, no organomegaly


Back Exam: normal inspection, no CVA tenderness


Extremity: normal range of motion, non-tender, no pedal edema


Neurologic: no motor/sensory deficits, alert, oriented x 3


Skin Exam: normal color


Lymphatic: no adenopathy





Progress





- EKG/XRAY/CT


EKG: Atrial, Fibrillation - VENTRICULAR RATE 149, LAD, , nonspecific ST T wave 

Chg - NAIP, , Unchanged from - 18 EXCEPT FOR RATE





Departure





- Departure


Disposition: Discharge to Home or Self Care


Departure Forms:  ED Discharge - Pt. Copy, Patient Portal Self Enrollment


Referrals: 


Desmond Ni III, MD [Primary Care Provider] - 1-2 Weeks


Home Medications: 


Ambulatory Orders





Allopurinol 100 mg PO DAILY 04/15/14 


Furosemide [Lasix] 40 mg PO DAILY@1200 04/15/14 


Calcium Citrate-Vitamin D [Calcium Citrate + D 315-200 mg-Unit] 1 tab PO DAILY 

18 


Cholecalciferol [Vitamin D] 1,000 unit PO DAILY 18 


Cyanocobalamin [Vitamin B-12] 1,000 mcg SL DAILY 18 


Digoxin [Digox] 125 mcg PO DAILY 18 


Escitalopram Oxalate [Lexapro] 5 mg PO DAILY 18 


Glipizide 10 mg PO BID 18 


Hydroxyzine HCl 1 - 2 tablet PO BEDTIME 18 


Ipratropium/Albuterol [Duoneb] 3 ml NEB QID PRN 18 


Losartan Potassium [Cozaar] 25 mg PO DAILY 18 


Metformin HCl [Metformin HCl ER] 500 mg PO TID 18 


Multiple Vitamins W/ Minerals [Abdek] 1 cap PO DAILY 18 


Omeprazole 20 mg PO BID 18 


Spironolactone [Aldactone] 25 mg PO DAILY 18 


Umeclidinium-Vilanterol [Anoro Ellipta 62.5-25 Mcg/INH] 1 aer IN DAILY 18 


Levofloxacin [Levaquin] 250 mg PO DAILY #6 tablet 18 


Prednisone See Taper PO DAILY #30 tab 18

## 2019-01-09 NOTE — RAD
EXAM DESCRIPTION: 



Chest,1 View



CLINICAL HISTORY: 



Cough and shortness of breath 



FINDINGS/ IMPRESSION: 



Comparison 4/25/2018



Interval development of interstitial infiltrate left upper lobe.

Subtle fullness possible slight retraction of the left hilum

superiorly. Recommend chest CT to exclude underlying obstructive

mass



Heart size is normal. Prominent central vasculature/pulmonary

artery on the right. Perihilar peribronchial thickening may

relate to chronic bronchitis



Electronically signed by:  Bandar Higgins MD  1/9/2019 10:11 AM

[facility] Workstation: [id number]

## 2019-01-09 NOTE — HP
SUPERVISING PHYSICIAN:  Danny Wood M.D.



CHIEF COMPLAINT:  Shortness of breath and coughing.



HISTORY OF PRESENT ILLNESS:  This is an 87 year-old male patient who lives at 
[facility].  He has had about 3 to 4 days of coughing, low-
grade fever and progressive weakness.  He actually fell yesterday but did not 
hit his head and had no loss of consciousness, but he refused to come to the E. 
R.  Due to the coughing and low-grade fever he did come this morning.  His 
temperature was 100.1 with a pulse rate of 141, respiratory rate of 36, blood 
pressure 114/57 and O2 sat was 90% on 3 liters nasal cannula.  Lab was done and 
he was found to have a sodium of 132 with potassium 5.7, chloride 100, carbon 
dioxide 23, BUN 38, creatinine 1.55.  His baseline creatinine is about 1.4 to 
1.5.  His glucose was elevated at 198.  He had a BNP of 460.  Total bilirubin 
was 1.6 and lactic acid was 3.1.  Troponin was slightly elevated at 0.06.  WBCs 
were 14.4 with hemoglobin 10.1, hematocrit 30.8.  He did have a left shift on 
his differential.  UA was basically within normal limits with a small amount of 
urine leukocyte esterase and trace of intact urine blood.  Blood cultures were 
drawn and an x-ray was also done.  Chest x-ray shows interval development of 
interstitial infiltrate in the left upper lobe with subtle fullness possible 
slight retraction to the left hilum superiorly.  Recommend chest CT to exclude 
underlying obstructive mass.  Heart size is normal.  Prominent central 
vasculature/pulmonary artery on the right, perihilar/peribronchial thickening 
may relate to chronic bronchitis.  Head CT was also done which showed no 
intracranial hemorrhage or evidence of cerebral contusion.  Senescent brain.  
White matter disease and volume loss.  Fluid in the left maxillary sinus 
partially visualized, may relate to sinusitis or trauma.  He was given some 
Cefepime in the Emergency Room as well as approximately 2 liters of fluid.  He 
was given several breathing treatments.  I was called for hospital admission.



PAST MEDICAL HISTORY: 

1.   Chronic obstructive pulmonary disease.

2.   Atrial fibrillation on Digoxin.

3.   Sleep apnea.

4.   Hypertension.

5.   Left bundle branch block.

6.   Cor pulmonale.

7.   Diverticulosis.

8.   Renal insufficiency.

9.   Gout.

10. Diabetes mellitus type 2.

11. Herpes zoster.



PAST SURGICAL HISTORY:

1.   Right inguinal hernia repair.

2.   Right total knee arthroplasty.

3.   Basal cell carcinoma excision.

4.   Colonoscopy with polypectomies.



CODE STATUS:  Do not resuscitate. 



CURRENT MEDICATIONS:  Per the EMR and awaiting verification.



ALLERGIES:  ATORVASTATIN, CODEINE, LISINOPRIL, MAGNESIUM, PENICILLIN, 
SIMVASTATIN AND SULFA.



FAMILY HISTORY:  Positive for Alzheimer's disease.



SOCIAL HISTORY:  He lives at [facility].  He does have a history of smoking 
but has quit since he has been in the nursing home.  There is no history of 
alcohol or illicit drug use.



REVIEW OF SYSTEMS: 

GENERAL:  Positive for fever and chills.  Negative for weight changes.

HEENT:  Negative for headaches, vision changes, ear pain or sore throat.

RESPIRATORY:  As per History of Present Illness.  Plus his wife said that he was
coughing up some pink-tinged sputum several weeks ago.

CARDIOVASCULAR:  Negative for chest pains, palpitations or tachycardia.

GASTROINTESTINAL:  Negative for nausea, vomiting, constipation or diarrhea.

GENITOURINARY:  Negative for dysuria, polyuria or hematuria.

MUSCULOSKELETAL:  Negative for arthralgias or myalgias.

NEUROLOGIC:  Negative for headaches, seizures or dizziness.



PHYSICAL EXAMINATION: 



VITAL SIGNS:  Temperature 100.4, heart rate 141, blood pressure 101/57, 
respiratory rate 23, O2 sat 93% on room air.



GENERAL: This is an 87 year-old male patient who is lying in his hospital bed.  
He looks to be moderately ill.



HEENT:  Normocephalic and atraumatic. Pupils are equal and reactive.  Oropharynx
is clear.



NECK:  Supple without mass.



RESPIRATORY:  Diminished breath sounds throughout with some wheezing in the 
apices and a few scattered rhonchi.



CHEST:  There is equal rise and fall of the chest with inspiration and 
expiration.



CARDIOVASCULAR:  Tachycardic rate, irregular rhythm.  Atrial fibrillation on the
cardiac monitor.



ABDOMEN:  Soft, nondistended, non-tender.  Bowel sounds are positive.  



EXTREMITIES:  No clubbing, cyanosis or edema.



NEUROLOGIC:  He is awake and alert to person and place only.



LABORATORY:  Labs and films are as per the History of Present Illness.



ASSESSMENT:  

1.   Sepsis secondary to bilateral pneumonia, most likely healthcare associated

      pneumonia with an admitting heart rate of greater than 140, respiratory 
rate 

      of 36, WBCs of 14,400, temperature of 100.4 and lactic acid of 3.1.

2.   Electrolyte imbalance most likely hyponatremia, hypochloridemia and

      hyperkalemia.

3.   Elevated bilirubin.

4.   Inguinal yeast infection.

5.   Diabetes mellitus type 2.

6.   Atrial fibrillation on Digoxin.

7.   Chronic obstructive pulmonary disease.

8.   Obstructive sleep apnea.

9.   Chronic renal insufficiency.

10. Gout on Allopurinol.

11. Hypertension on Losartan.



PLAN:  We will admit the patient to the hospital.  I have initiated the 
pneumonia guidelines.  Will continue him on Cefepime and monitor his cultures as
they become available.  I have rechecked his lactic acid and will give him 
judicious fluids overnight.  I have ordered Diflucan as a one time dose for the 
yeast infection and will also give him Nystatin powder.  He is also on sliding 
scale insulin with blood sugars to be checked a.c. and h.s.  I will restart his 
home medications as soon as they are verified.  He will get good aggressive 
pulmonary hygiene.  I have also given him Lovenox for DVT prophylaxis as well as
a PPI for ulcer prophylaxis.  We will continue to monitor him closely and 
followup as needed.  Dr. Wood is the collaborating physician available for 
consultation.



#[id number]

[facility]

## 2019-01-09 NOTE — CT
EXAM DESCRIPTION: 



CT head without contrast



CLINICAL HISTORY: 



Fall injury. Headache





COMPARISON: 



None.



TECHNIQUE: 



Noncontrast spiral CT of the brain. This exam was performed

according to our departmental dose-optimization program, which

includes automated exposure control, adjustment of the mA and/or

kV according to patient size and/or use of iterative

reconstruction technique



FINDINGS: 



Motion degraded study limits evaluation of facial bones and skull

base. No calvarial or skull base fracture identified. No fluid in

the mastoid air cells. Fluid in the partially visualized left

maxillary sinus which could be sinusitis or trauma related



Cerebral volume loss with prominence of the cortical sulci most

significantly affecting the frontal lobes. Prominent ventricular

system likely central volume loss. No suspicion of obstructive

hydrocephalus. Moderate white matter disease, nonspecific likely

remote microvascular ischemia



No intracranial hemorrhage or mass lesion. Atherosclerotic

vascular calcifications of the cavernous internal carotid

arteries



IMPRESSION: 



No intracranial hemorrhage or evidence of cerebral contusion.

Senescent brain. White matter disease and volume loss



Fluid in the left maxillary sinus, partially visualized may

relate to sinusitis or trauma



CT is insensitive for early evaluation of acute stroke. If there

is clinical concern for acute ischemia, an MRI may be considered.



Electronically signed by:  Bandar Higgins MD  1/9/2019 10:19 AM

[facility] Workstation: [id number]

## 2019-01-10 RX ADMIN — SPIRONOLACTONE SCH MG: 25 TABLET, FILM COATED ORAL at 09:53

## 2019-01-10 RX ADMIN — INSULIN LISPRO SCH UNITS: 100 INJECTION, SOLUTION INTRAVENOUS; SUBCUTANEOUS at 12:40

## 2019-01-10 RX ADMIN — IPRATROPIUM BROMIDE AND ALBUTEROL SULFATE SCH ML: .5; 3 SOLUTION RESPIRATORY (INHALATION) at 07:10

## 2019-01-10 RX ADMIN — NYSTATIN SCH: 100000 POWDER TOPICAL at 09:00

## 2019-01-10 RX ADMIN — NYSTATIN SCH APPLIC: 100000 POWDER TOPICAL at 13:40

## 2019-01-10 RX ADMIN — DILTIAZEM HYDROCHLORIDE SCH MG: 30 TABLET, FILM COATED ORAL at 18:43

## 2019-01-10 RX ADMIN — Medication PRN MLS/HR: at 03:31

## 2019-01-10 RX ADMIN — SODIUM CHLORIDE SCH MLS/HR: 9 INJECTION, SOLUTION INTRAVENOUS at 11:36

## 2019-01-10 RX ADMIN — ISODIUM CHLORIDE PRN MG: 0.03 SOLUTION RESPIRATORY (INHALATION) at 04:03

## 2019-01-10 RX ADMIN — Medication PRN MLS/HR: at 17:36

## 2019-01-10 RX ADMIN — PANTOPRAZOLE SODIUM SCH MG: 40 INJECTION, POWDER, FOR SOLUTION INTRAVENOUS at 06:01

## 2019-01-10 RX ADMIN — METFORMIN HYDROCHLORIDE SCH MG: 500 TABLET, EXTENDED RELEASE ORAL at 21:06

## 2019-01-10 RX ADMIN — NYSTATIN SCH APPLIC: 100000 POWDER TOPICAL at 09:55

## 2019-01-10 RX ADMIN — ALLOPURINOL SCH MG: 100 TABLET ORAL at 09:51

## 2019-01-10 RX ADMIN — IPRATROPIUM BROMIDE AND ALBUTEROL SULFATE SCH ML: .5; 3 SOLUTION RESPIRATORY (INHALATION) at 11:22

## 2019-01-10 RX ADMIN — NYSTATIN SCH APPLIC: 100000 POWDER TOPICAL at 17:10

## 2019-01-10 RX ADMIN — DILTIAZEM HYDROCHLORIDE SCH MG: 30 TABLET, FILM COATED ORAL at 06:01

## 2019-01-10 RX ADMIN — ISODIUM CHLORIDE PRN MG: 0.03 SOLUTION RESPIRATORY (INHALATION) at 00:06

## 2019-01-10 RX ADMIN — IPRATROPIUM BROMIDE AND ALBUTEROL SULFATE SCH: .5; 3 SOLUTION RESPIRATORY (INHALATION) at 19:34

## 2019-01-10 RX ADMIN — SODIUM CHLORIDE SCH MLS/HR: 9 INJECTION, SOLUTION INTRAVENOUS at 23:12

## 2019-01-10 RX ADMIN — INSULIN LISPRO SCH UNITS: 100 INJECTION, SOLUTION INTRAVENOUS; SUBCUTANEOUS at 21:06

## 2019-01-10 RX ADMIN — INSULIN LISPRO SCH UNITS: 100 INJECTION, SOLUTION INTRAVENOUS; SUBCUTANEOUS at 17:09

## 2019-01-10 RX ADMIN — IPRATROPIUM BROMIDE AND ALBUTEROL SULFATE SCH ML: .5; 3 SOLUTION RESPIRATORY (INHALATION) at 15:53

## 2019-01-10 RX ADMIN — NYSTATIN SCH APPLIC: 100000 POWDER TOPICAL at 21:06

## 2019-01-10 RX ADMIN — FUROSEMIDE SCH MG: 40 TABLET ORAL at 09:53

## 2019-01-10 RX ADMIN — INSULIN LISPRO SCH UNITS: 100 INJECTION, SOLUTION INTRAVENOUS; SUBCUTANEOUS at 07:56

## 2019-01-10 RX ADMIN — METFORMIN HYDROCHLORIDE SCH MG: 500 TABLET, EXTENDED RELEASE ORAL at 09:53

## 2019-01-10 RX ADMIN — DILTIAZEM HYDROCHLORIDE SCH MG: 30 TABLET, FILM COATED ORAL at 12:48

## 2019-01-10 RX ADMIN — ESCITALOPRAM OXALATE SCH MG: 10 TABLET ORAL at 09:52

## 2019-01-10 RX ADMIN — METFORMIN HYDROCHLORIDE SCH MG: 500 TABLET, EXTENDED RELEASE ORAL at 15:23

## 2019-01-10 RX ADMIN — DILTIAZEM HYDROCHLORIDE SCH MG: 30 TABLET, FILM COATED ORAL at 00:05

## 2019-01-10 RX ADMIN — POLYETHYLENE GLYCOL 3350 SCH GM: 17 POWDER, FOR SOLUTION ORAL at 17:10

## 2019-01-10 RX ADMIN — ENOXAPARIN SODIUM SCH MG: 30 INJECTION, SOLUTION INTRAVENOUS; SUBCUTANEOUS at 21:06

## 2019-01-10 RX ADMIN — LOSARTAN POTASSIUM SCH MG: 25 TABLET ORAL at 09:53

## 2019-01-10 RX ADMIN — DIGOXIN SCH MG: 125 TABLET ORAL at 09:52

## 2019-01-10 NOTE — RAD
CHEST  1/10/2019



CLINICAL HISTORY:  Pneumonia.



COMPARISON: Chest 1/9/2019.



TECHNIQUE: AP Chest.



FINDINGS: 



There are new dense airspace opacities throughout the left upper

lobe consistent with pneumonia. There is bilateral lower lobe

atelectasis. No pneumothorax or large pleural effusion. Left

costophrenic angle is excluded from the field of imaging.



Cardiac size is enlarged. There is no pulmonary edema. There is

no pulmonary vascular congestion. Lungs are mildly hyperinflated.



IMPRESSION:



1. Significant left upper lobe pneumonia. Bilateral lower lobe

atelectasis.

2. Hyperinflation.



Electronically signed by:  Rubina Liz DO  1/10/2019 7:13 AM CST

Workstation: [id number]

## 2019-01-10 NOTE — PN
SUPERVISING PHYSICIAN:  Danny Wood MD



DATE:  01/10/19



SUBJECTIVE:  The patient is lying in his bed.  He is asleep.  He awakens easily.
 He has no complaints.  He denies shortness of breath, nausea or vomiting.  He 
answers simple yes/no questions appropriately.  There is no family at the 
bedside at this time.  



OBJECTIVE:  

VITAL SIGNS:  Temperature 97.8.  Heart rate 83.  Blood pressure 97/49.  
Respiratory rate 22.  O2 saturation 93% on high-flow cannula.  

RESPIRATORY:  Coarse rhonchi on the right upper and lower lobes.  A few 
scattered expiratory wheezes in the apices.  Diminished breath sounds 
throughout.  He is slightly tachypneic at times.  

GASTROINTESTINAL: Abdomen is soft, nondistended, nontender.  Bowel sounds are 
positive.  

NEUROLOGIC: Awake, alert and oriented to person and place only. 



LABORATORY:  WBCs have normalized to 10.6, but he still has a left shift on 
differential.  Hemoglobin 9.2, hematocrit 28.6.  Blood sugars have run between 
162 and 310.  Sodium 135, potassium improved slightly to 5.2, chloride 106, BUN 
37, creatinine slightly improved to 1.48.  Bilirubin 1.3.  Lactic acid from 
yesterday after fluid was 1.6.  Digoxin 1.3.  Preliminary blood cultures show no
growth after 24 hours.  Urine cultures pending.  Chest x-ray shows significant 
left upper lobe pneumonia, bilateral lower lobe atelectasis and hyperinflation. 
All other labs and films have been reviewed via the EMR.  



ASSESSMENT: 

1.   Sepsis secondary to bilateral pneumonia, most likely healthcare associated

      pneumonia with an admitting heart rate of greater than 140, respiratory 
rate 

      of 36, WBCs of 14,400, temperature of 100.4 and lactic acid of 3.1.

2.   Atrial fibrillation on digoxin with accelerated heart rates up to the 140s.

3.   Electrolyte imbalance, improved.

4.   Elevated bilirubin, improved.

5.   Inguinal yeast infection.

6.   Diabetes mellitus, type 2.

7.   Chronic obstructive pulmonary disease.

8.   Obstructive sleep apnea.

9.   Chronic renal insufficiency.

10. Gout on allopurinol.

11. Hypertension on losartan.



PLAN:  We will continue present supportive care.  We will monitor his cultures 
as they become available.  Continue on the cefepime for right now.  Yesterday, 
he required some Cardizem because his heart rate was staying in the 130s to 140s
and I put him on some p.o. Cardizem 30 mg every 6 hours.  So far, his heart rate
has stayed in the 80s and 90s.  He is continuing on his digoxin, but may need 
the Cardizem on discharge.  I have repeated his labs tomorrow.  He is on sliding
scale insulin with a.c. and h.s. blood sugar checks.  Aggressive pulmonary 
hygiene.  We will continue to monitor the patient closely and follow as needed. 
Dr. Wood is the collaborating physician and available for consultation.  



#[id number]

[facility]

## 2019-01-11 RX ADMIN — METFORMIN HYDROCHLORIDE SCH MG: 500 TABLET, EXTENDED RELEASE ORAL at 15:37

## 2019-01-11 RX ADMIN — IPRATROPIUM BROMIDE AND ALBUTEROL SULFATE SCH ML: .5; 3 SOLUTION RESPIRATORY (INHALATION) at 12:29

## 2019-01-11 RX ADMIN — PANTOPRAZOLE SODIUM SCH MG: 40 INJECTION, POWDER, FOR SOLUTION INTRAVENOUS at 06:12

## 2019-01-11 RX ADMIN — INSULIN LISPRO SCH UNITS: 100 INJECTION, SOLUTION INTRAVENOUS; SUBCUTANEOUS at 11:43

## 2019-01-11 RX ADMIN — METFORMIN HYDROCHLORIDE SCH MG: 500 TABLET, EXTENDED RELEASE ORAL at 08:03

## 2019-01-11 RX ADMIN — INSULIN LISPRO SCH UNITS: 100 INJECTION, SOLUTION INTRAVENOUS; SUBCUTANEOUS at 17:00

## 2019-01-11 RX ADMIN — FUROSEMIDE SCH MG: 40 TABLET ORAL at 08:03

## 2019-01-11 RX ADMIN — ENOXAPARIN SODIUM SCH MG: 30 INJECTION, SOLUTION INTRAVENOUS; SUBCUTANEOUS at 20:54

## 2019-01-11 RX ADMIN — DILTIAZEM HYDROCHLORIDE SCH MG: 30 TABLET, FILM COATED ORAL at 17:43

## 2019-01-11 RX ADMIN — ISODIUM CHLORIDE PRN MG: 0.03 SOLUTION RESPIRATORY (INHALATION) at 02:30

## 2019-01-11 RX ADMIN — Medication PRN MLS/HR: at 06:12

## 2019-01-11 RX ADMIN — IPRATROPIUM BROMIDE AND ALBUTEROL SULFATE SCH ML: .5; 3 SOLUTION RESPIRATORY (INHALATION) at 20:09

## 2019-01-11 RX ADMIN — DILTIAZEM HYDROCHLORIDE SCH MG: 30 TABLET, FILM COATED ORAL at 12:00

## 2019-01-11 RX ADMIN — SODIUM CHLORIDE SCH MLS/HR: 9 INJECTION, SOLUTION INTRAVENOUS at 10:41

## 2019-01-11 RX ADMIN — ALLOPURINOL SCH MG: 100 TABLET ORAL at 08:04

## 2019-01-11 RX ADMIN — DILTIAZEM HYDROCHLORIDE SCH MG: 30 TABLET, FILM COATED ORAL at 00:18

## 2019-01-11 RX ADMIN — NYSTATIN SCH APPLIC: 100000 POWDER TOPICAL at 08:05

## 2019-01-11 RX ADMIN — SPIRONOLACTONE SCH MG: 25 TABLET, FILM COATED ORAL at 08:03

## 2019-01-11 RX ADMIN — DILTIAZEM HYDROCHLORIDE SCH MG: 30 TABLET, FILM COATED ORAL at 06:12

## 2019-01-11 RX ADMIN — IPRATROPIUM BROMIDE AND ALBUTEROL SULFATE SCH ML: .5; 3 SOLUTION RESPIRATORY (INHALATION) at 16:29

## 2019-01-11 RX ADMIN — NYSTATIN SCH APPLIC: 100000 POWDER TOPICAL at 17:01

## 2019-01-11 RX ADMIN — NYSTATIN SCH APPLIC: 100000 POWDER TOPICAL at 20:54

## 2019-01-11 RX ADMIN — ESCITALOPRAM OXALATE SCH MG: 10 TABLET ORAL at 08:04

## 2019-01-11 RX ADMIN — METFORMIN HYDROCHLORIDE SCH MG: 500 TABLET, EXTENDED RELEASE ORAL at 20:54

## 2019-01-11 RX ADMIN — LOSARTAN POTASSIUM SCH MG: 25 TABLET ORAL at 08:04

## 2019-01-11 RX ADMIN — IPRATROPIUM BROMIDE AND ALBUTEROL SULFATE SCH ML: .5; 3 SOLUTION RESPIRATORY (INHALATION) at 08:43

## 2019-01-11 RX ADMIN — Medication PRN ML: at 13:41

## 2019-01-11 RX ADMIN — INSULIN LISPRO SCH UNITS: 100 INJECTION, SOLUTION INTRAVENOUS; SUBCUTANEOUS at 08:00

## 2019-01-11 RX ADMIN — POLYETHYLENE GLYCOL 3350 SCH: 17 POWDER, FOR SOLUTION ORAL at 08:04

## 2019-01-11 RX ADMIN — INSULIN LISPRO SCH UNITS: 100 INJECTION, SOLUTION INTRAVENOUS; SUBCUTANEOUS at 21:39

## 2019-01-11 RX ADMIN — NYSTATIN SCH APPLIC: 100000 POWDER TOPICAL at 12:56

## 2019-01-11 RX ADMIN — MORPHINE SULFATE PRN MG: 10 INJECTION INTRAVENOUS at 13:40

## 2019-01-11 RX ADMIN — SODIUM CHLORIDE SCH MLS/HR: 9 INJECTION, SOLUTION INTRAVENOUS at 23:03

## 2019-01-11 RX ADMIN — DIGOXIN SCH MG: 125 TABLET ORAL at 08:03

## 2019-01-11 RX ADMIN — Medication PRN MLS/HR: at 18:33

## 2019-01-11 NOTE — PN
SUPERVISING PHYSICIAN:  Danny Wood MD



DATE:  01/11/19 



SUBJECTIVE:  The patient is lying in his hospital bed.  He has family in his 
room.  They have talked to [facility] and when the patient leaves the 
hospital going back to [facility], he will have [facility].  At this 
time, we will continue to aggressively treat his issues.  I explained to them 
that his prognosis is poor as he has had very low blood pressure, his 
respiratory rate is up, his creatinine is slightly worsened.  They understand 
that his condition is very poor.  The patient actually denies any pain, nausea, 
vomiting, coughing, shortness of breath.



OBJECTIVE:  

VITAL SIGNS:  Temperature 98.1.  Heart rate 86.  Blood pressure 92/69.  Earlier 
it had been 81/55.  Respiratory rate runs between 24 and 34.  O2 saturation 92%.

RESPIRATORY:  Diminished breath sounds throughout.  He is tachypneic with a few 
scattered rhonchi.

CARDIAC:  regular rate and rhythm.

GASTROINTESTINAL: Abdomen is soft, nondistended, nontender.  Bowel sounds are 
positive.  

NEUROLOGIC: Awake, alert and oriented to person and place.



LABORATORY:  WBCs 11,600, hemoglobin and hematocrit stable at 9.2 and 28.2.  He 
has a left shift on his differential.  Blood sugars have been running between 
157 and 243.  Sodium 135, potassium 5.7, chloride 105, carbon dioxide 21, BUN 
44, creatinine 1.82.  Calcium 8.9, magnesium 2.  Preliminary blood cultures 
showed no growth after 48 hours.  Urine culture pending.  All other labs and 
films have been reviewed via the EMR.  



ASSESSMENT: 

1.   Sepsis secondary to bilateral pneumonia, most likely healthcare associated

      pneumonia with an admitting heart rate of greater than 140, respiratory 
rate 

      of 36, WBCs of 14,400, temperature of 100.4 and lactic acid of 3.1.

2.   Atrial fibrillation on digoxin with accelerated heart rates up to the 140s.

3.   Electrolyte imbalance, improved.

4.   Elevated bilirubin, improved.

5.   Inguinal yeast infection.

6.   Diabetes mellitus, type 2.

7.   Chronic obstructive pulmonary disease.

8.   Obstructive sleep apnea.

9.   Chronic renal insufficiency.

10. Gout on allopurinol.

11. Hypertension on losartan.



PLAN:  We will continue present supportive care.  We will continue to monitor 
his cultures as they become available.  His condition is slightly worsened and 
the family has discussed using [facility].  I also spoke with Dr. Ni and
he felt it would be an appropriate admission to hospice, so at this time when he
goes back to [facility], he will need to be set up with [facility].  I 
did have to increase his Cardizem yesterday to 60 mg every 6 hours as his heart 
rate was up in the 120s to 130s.  His heart rate is staying down in the 80s and 
90s at this time.  He will need to be discharged on some Cardizem for now.  We 
will continue aggressive pulmonary hygiene and monitor the patient and treat as 
indicated.  Dr. Wood is the collaborating physician and available for 
consultation.  



#[id number]

[facility]

## 2019-01-12 RX ADMIN — MORPHINE SULFATE PRN MG: 10 INJECTION INTRAVENOUS at 09:36

## 2019-01-12 RX ADMIN — ENOXAPARIN SODIUM SCH MG: 30 INJECTION, SOLUTION INTRAVENOUS; SUBCUTANEOUS at 20:55

## 2019-01-12 RX ADMIN — ALLOPURINOL SCH: 100 TABLET ORAL at 10:08

## 2019-01-12 RX ADMIN — Medication PRN MLS/HR: at 07:07

## 2019-01-12 RX ADMIN — FUROSEMIDE SCH: 40 TABLET ORAL at 10:07

## 2019-01-12 RX ADMIN — METFORMIN HYDROCHLORIDE SCH: 500 TABLET, EXTENDED RELEASE ORAL at 21:04

## 2019-01-12 RX ADMIN — METFORMIN HYDROCHLORIDE SCH: 500 TABLET, EXTENDED RELEASE ORAL at 16:27

## 2019-01-12 RX ADMIN — IPRATROPIUM BROMIDE AND ALBUTEROL SULFATE SCH ML: .5; 3 SOLUTION RESPIRATORY (INHALATION) at 08:25

## 2019-01-12 RX ADMIN — METFORMIN HYDROCHLORIDE SCH: 500 TABLET, EXTENDED RELEASE ORAL at 10:06

## 2019-01-12 RX ADMIN — MORPHINE SULFATE PRN MG: 10 INJECTION INTRAVENOUS at 18:18

## 2019-01-12 RX ADMIN — LINEZOLID SCH MLS/HR: 2 INJECTION, SOLUTION INTRAVENOUS at 14:19

## 2019-01-12 RX ADMIN — ESCITALOPRAM OXALATE SCH: 10 TABLET ORAL at 10:07

## 2019-01-12 RX ADMIN — SODIUM CHLORIDE SCH MLS/HR: 9 INJECTION, SOLUTION INTRAVENOUS at 22:45

## 2019-01-12 RX ADMIN — DILTIAZEM HYDROCHLORIDE SCH MG: 30 TABLET, FILM COATED ORAL at 00:08

## 2019-01-12 RX ADMIN — NYSTATIN SCH APPLIC: 100000 POWDER TOPICAL at 20:55

## 2019-01-12 RX ADMIN — IPRATROPIUM BROMIDE AND ALBUTEROL SULFATE SCH ML: .5; 3 SOLUTION RESPIRATORY (INHALATION) at 11:55

## 2019-01-12 RX ADMIN — DIGOXIN SCH: 125 TABLET ORAL at 10:07

## 2019-01-12 RX ADMIN — SPIRONOLACTONE SCH: 25 TABLET, FILM COATED ORAL at 10:06

## 2019-01-12 RX ADMIN — ISODIUM CHLORIDE PRN MG: 0.03 SOLUTION RESPIRATORY (INHALATION) at 04:00

## 2019-01-12 RX ADMIN — DILTIAZEM HYDROCHLORIDE SCH MG: 30 TABLET, FILM COATED ORAL at 06:05

## 2019-01-12 RX ADMIN — IPRATROPIUM BROMIDE AND ALBUTEROL SULFATE SCH ML: .5; 3 SOLUTION RESPIRATORY (INHALATION) at 16:10

## 2019-01-12 RX ADMIN — DILTIAZEM HYDROCHLORIDE SCH: 30 TABLET, FILM COATED ORAL at 18:08

## 2019-01-12 RX ADMIN — NYSTATIN SCH APPLIC: 100000 POWDER TOPICAL at 10:08

## 2019-01-12 RX ADMIN — INSULIN LISPRO SCH: 100 INJECTION, SOLUTION INTRAVENOUS; SUBCUTANEOUS at 07:59

## 2019-01-12 RX ADMIN — NYSTATIN SCH APPLIC: 100000 POWDER TOPICAL at 17:52

## 2019-01-12 RX ADMIN — PANTOPRAZOLE SODIUM SCH MG: 40 TABLET, DELAYED RELEASE ORAL at 06:05

## 2019-01-12 RX ADMIN — POLYETHYLENE GLYCOL 3350 SCH: 17 POWDER, FOR SOLUTION ORAL at 10:07

## 2019-01-12 RX ADMIN — DILTIAZEM HYDROCHLORIDE SCH: 30 TABLET, FILM COATED ORAL at 16:26

## 2019-01-12 RX ADMIN — SODIUM CHLORIDE SCH MLS/HR: 9 INJECTION, SOLUTION INTRAVENOUS at 12:40

## 2019-01-12 RX ADMIN — IPRATROPIUM BROMIDE AND ALBUTEROL SULFATE SCH ML: .5; 3 SOLUTION RESPIRATORY (INHALATION) at 20:38

## 2019-01-12 RX ADMIN — MORPHINE SULFATE PRN MG: 10 INJECTION INTRAVENOUS at 12:22

## 2019-01-12 RX ADMIN — NYSTATIN SCH APPLIC: 100000 POWDER TOPICAL at 16:27

## 2019-01-12 RX ADMIN — LOSARTAN POTASSIUM SCH: 25 TABLET ORAL at 10:06

## 2019-01-12 RX ADMIN — INSULIN LISPRO SCH UNITS: 100 INJECTION, SOLUTION INTRAVENOUS; SUBCUTANEOUS at 17:50

## 2019-01-12 NOTE — RAD
EXAM: AP CHEST RADIOGRAPH



CLINICAL INDICATION: Pneumonia.



COMPARISON: Compared to chest radiograph January 10, 2019.



FINDINGS: Increasingly dense left upper lobe consolidation and

right lower lobe consolidation. New small bilateral pleural

effusions. No pneumothorax, pneumomediastinum or free peritoneal

gas. No hilar or mediastinal lymphadenopathy. No mediastinal

widening. Bones are intact on this single view.



IMPRESSION: Increasing bilateral pulmonary consolidations and new

small bilateral pleural effusions suspicious for pneumonia.



Electronically signed by:  Nathan Davis MD  1/12/2019 6:53 AM

[facility] Workstation: [id number]

## 2019-01-12 NOTE — PN
DATE:  01/12/19



SUPERVISING PHYSICIAN:  Danny Wood M.D.



SUBJECTIVE:  The patient is showing some deterioration this morning.  His 
breathing is more labored.  He is having to utilize higher flow of oxygen with a
Venti mask at 55% at least to maintain his O2 saturation above 90%.  He has been
getting Ativan and is showing to be restless.  The patient's family is at 
bedside and I did discuss with them at length the patient's situation and they 
were very appreciative of this.  He has been afebrile and at this point had 
control of his heart rate since Cardizem, however he has gotten where he will 
not take his oral medications without choking.



OBJECTIVE:  VITAL SIGNS: Temperature 98.4, pulse 96, blood pressure 102/54, 
respirations 20, satting anywhere from 87% on high flow nasal cannula up to 92% 
with a Venti mask at 55%.  I's and O's are showing a positive balance with 1323 
with 1890 in, 575 out.  His weight has gone up from 79.2 kg to 81.8 kg and is up
from admission of 77.7 kg.  GENERAL: The patient is resting in bed with a Venti 
mask in place.  He is minimally responsive but does attempt to pull his mask off
and will follow basic commands, but he appears to be comfortable in no acute 
distress, although he is showing some increased work to breathe.  CHEST: Lung 
sounds are significantly decreased throughout with prominent rhonchi heard 
bilaterally, but no obvious wheezing or rales.  HEART: Irregular rate and rhythm
but a controlled ventricular rate in the 80s and 90s according to bedside 
monitor.  ABDOMEN: Soft, non-tender.  Positive bowel sounds.  EXTREMITIES: Just 
a trace of edema bilaterally. NEUROLOGIC: He is lethargic but will respond to 
verbal and tactile stimulation, but does not answer questions verbally.



LABORATORY:  CBC shows white count 9,000, hemoglobin now at 8.4, hematocrit 
26.3, platelet count 167,000.  Differential does show a continued left shift.  
Chemistries shows a sodium 134, potassium 5.7, chloride 106, carbon dioxide 22, 
anion gap low at 11.7, BUN 59, creatinine 2.27.  Blood sugars have ranged 
between 180 and 239.  Calcium .8, magnesium 2.0.



MICROBIOLOGY:  Urine culture is pending.  Blood cultures show no growth in 3 
days.



RADIOLOGY:  Chest x-ray this morning per radiology interpretation shows 
increasing bilateral pulmonary consolidation and/or new small bilateral pleural 
effusions suspicious for pneumonia.



ASSESSMENT: 

1.   Sepsis secondary to bilateral pneumonia, likely healthcare acquired.

2.   Atrial fibrillation having been on digoxin therapy with now controlled 
ventricular rates

      on Cardizem.

3.   Persistent electrolyte imbalance with hyperkalemia and hyponatremia.

4.   Elevated bilirubin returning to baseline.

5.   Persistent inguinal yeast infection.

6.   Diabetes mellitus, type 2 with good glycemic control.

7.   Chronic obstructive pulmonary disease with exacerbation secondary to 
bilateral

      pneumonia, both left upper lobe and right lower lobe.

8.   Obstructive sleep apnea.

9.   Acute on chronic renal insufficiency with slight worsening creatinine, 
probably

      some prerenal azotemia from intravascular dehydration and Lasix 
administration.

10. Gout on allopurinol.

11. Hypertension on losartan showing to be stable.



PLAN:  Given that he had a significant decrease in his clinical condition I am 
going to increase his antibiotic coverage with Zyvox and Levaquin, with the 
Levaquin being renally dosed and he will continue to Cefepime for coverage of 
healthcare acquired pneumonia.  Will continue with aggressive pulmonary hygiene.
 I have increased his pain management by decreasing the morphine from every 4 
hours to every 3 hours 1 to 2 mg as well as increasing Ativan to 1 mg every 6 
hours p.r.n.  I did discussed at length with the patient's family the patient's 
clinical condition and the fact that I am afraid that the patient may not 
survive current hospitalization and they have been in touch with [facility].  We 
did discuss continuing with aggressive management in regards to antibiotic 
therapy and more aggressive management with some comfort measures with morphine 
and Ativan to prevent the patient from being restless and in any pain.  They are
in a discussion of possible option to go to inpatient hospice if his condition 
worsens or if he does show improvement certainly will go back to the nursing 
home on [facility].  Will continue with his antibiotic coverage.  I have 
given him 40 of Lasix and probably cover additional 40 this afternoon to 
hopefully increase his output.  I have given him some low maintenance fluids 
with some D5 since the patient has refused to take any oral intake, and 
hopefully will get a better response to his fluid management and see if it seems
to be improving his kidney function overnight.  Will plan to repeat his labs in 
the morning as well as a chest x-ray.  Until the patient can transition either 
to an outpatient setting or possibly to inpatient hospice, will continue to 
treat as needed.



#[id number]

[facility]

## 2019-01-13 VITALS — DIASTOLIC BLOOD PRESSURE: 59 MMHG | TEMPERATURE: 97 F | SYSTOLIC BLOOD PRESSURE: 92 MMHG

## 2019-01-13 VITALS — OXYGEN SATURATION: 92 %

## 2019-01-13 RX ADMIN — MORPHINE SULFATE PRN MG: 10 INJECTION INTRAVENOUS at 09:49

## 2019-01-13 RX ADMIN — IPRATROPIUM BROMIDE AND ALBUTEROL SULFATE SCH ML: .5; 3 SOLUTION RESPIRATORY (INHALATION) at 12:10

## 2019-01-13 RX ADMIN — PANTOPRAZOLE SODIUM SCH: 40 TABLET, DELAYED RELEASE ORAL at 06:21

## 2019-01-13 RX ADMIN — NYSTATIN SCH: 100000 POWDER TOPICAL at 16:58

## 2019-01-13 RX ADMIN — INSULIN LISPRO SCH UNITS: 100 INJECTION, SOLUTION INTRAVENOUS; SUBCUTANEOUS at 06:15

## 2019-01-13 RX ADMIN — ALLOPURINOL SCH: 100 TABLET ORAL at 13:57

## 2019-01-13 RX ADMIN — DILTIAZEM HYDROCHLORIDE SCH: 30 TABLET, FILM COATED ORAL at 06:15

## 2019-01-13 RX ADMIN — INSULIN LISPRO SCH: 100 INJECTION, SOLUTION INTRAVENOUS; SUBCUTANEOUS at 16:58

## 2019-01-13 RX ADMIN — LINEZOLID SCH MLS/HR: 2 INJECTION, SOLUTION INTRAVENOUS at 01:42

## 2019-01-13 RX ADMIN — MORPHINE SULFATE PRN MG: 10 INJECTION INTRAVENOUS at 06:40

## 2019-01-13 RX ADMIN — MORPHINE SULFATE PRN MG: 10 INJECTION INTRAVENOUS at 03:08

## 2019-01-13 RX ADMIN — LINEZOLID SCH: 2 INJECTION, SOLUTION INTRAVENOUS at 16:58

## 2019-01-13 RX ADMIN — MORPHINE SULFATE PRN MG: 10 INJECTION INTRAVENOUS at 00:29

## 2019-01-13 RX ADMIN — DILTIAZEM HYDROCHLORIDE SCH: 30 TABLET, FILM COATED ORAL at 00:08

## 2019-01-13 RX ADMIN — Medication PRN ML: at 03:08

## 2019-01-13 RX ADMIN — IPRATROPIUM BROMIDE AND ALBUTEROL SULFATE SCH ML: .5; 3 SOLUTION RESPIRATORY (INHALATION) at 08:43

## 2019-01-13 RX ADMIN — INSULIN LISPRO SCH UNITS: 100 INJECTION, SOLUTION INTRAVENOUS; SUBCUTANEOUS at 00:24

## 2019-01-13 NOTE — RAD
EXAM DESCRIPTION: Chest,1 View



CLINICAL HISTORY:87 years Male, pneumonia



Comparison: January 12, 2019



FINDINGS:

Unchanged dense left upper lung consolidation and worsening

bibasilar patchy opacities with small pleural effusions. CT chest

recommended for further evaluation.

 Cardiac silhouette is unchanged. No pneumothorax.



Electronically signed by:  Gregory Martines MD  1/13/2019 6:50 AM

[facility] Workstation: [id number]

## 2019-01-14 NOTE — DS
SUPERVISING PHYSICIAN:  Danny Wood MD



ADMISSION DIAGNOSIS:

1.   Sepsis secondary to bilateral pneumonia, likely healthcare acquired.

2.   Electrolyte imbalance most likely hyponatremia, hypochloremia and 
hyperkalemia.

3.   Mildly elevated bilirubin.

4.   Intertrigo of the inguinal area.

5.   Diabetes mellitus, type 2.

6.   Atrial fibrillation on digoxin.

7.   Chronic obstructive pulmonary disease.

8.   Obstructive sleep apnea.

9.   Chronic renal insufficiency.

10. Gout on allopurinol.

11. Hypertension on losartan.



DISCHARGE DIAGNOSIS: 

1.   Sepsis secondary to bilateral pneumonia, likely healthcare acquired.

2.   Atrial fibrillation on digoxin and newly started Cardizem.

3.   Persistent electrolyte imbalance including worsening hyperkalemia secondary
to

      acute renal failure.

4.   Acute renal failure with hyperkalemia secondary to #1.

5.   Persistent inguinal yeast infection.

6.   Mildly elevated bilirubin.

7.   Diabetes mellitus, type 2.

8.   Chronic obstructive pulmonary disease with exacerbation secondary to 
bilateral

      pneumonia of the left upper lobe and right lower lobe.

9.   Obstructive sleep apnea.

10. Gout on allopurinol.

11. Hypertension, but showing to be hypotensive.



REASON FOR HOSPITAL ADMISSION:  This is an 87 year-old male patient who lives at
[facility].  He has had about 3 to 4 days of coughing, low-
grade fever and progressive weakness.  He actually fell yesterday but did not 
hit his head and had no loss of consciousness, but he refused to come to the [facility] 
[facility].  Due to the coughing and low-grade fever he did come this morning.  His 
temperature was 100.1 with a pulse rate of 141, respiratory rate of 36, blood 
pressure 114/57 and O2 sat was 90% on 3 liters nasal cannula.  Lab was done and 
he was found to have a sodium of 132 with potassium 5.7, chloride 100, carbon 
dioxide 23, BUN 38, creatinine 1.55.  His baseline creatinine is about 1.4 to 
1.5.  His glucose was elevated at 198.  He had a BNP of 460.  Total bilirubin 
was 1.6 and lactic acid was 3.1.  Troponin was slightly elevated at 0.06.  WBCs 
were 14.4 with hemoglobin 10.1, hematocrit 30.8.  He did have a left shift on 
his differential.  UA was basically within normal limits with a small amount of 
urine leukocyte esterase and trace of intact urine blood.  Blood cultures were 
drawn and an x-ray was also done.  Chest x-ray shows interval development of 
interstitial infiltrate in the left upper lobe with subtle fullness possible 
slight retraction to the left hilum superiorly.  Recommend chest CT to exclude 
underlying obstructive mass.  Heart size is normal.  Prominent central 
vasculature/pulmonary artery on the right, perihilar/peribronchial thickening 
may relate to chronic bronchitis.  Head CT was also done which showed no 
intracranial hemorrhage or evidence of cerebral contusion.  Senescent brain.  
White matter disease and volume loss.  Fluid in the left maxillary sinus 
partially visualized, may relate to sinusitis or trauma.  He was given some 
Cefepime in the Emergency Room as well as approximately 2 liters of fluid.  He 
was given several breathing treatments.  I was called for hospital admission.



LABORATORY:  White count on admission was 14,400.  On day of discharge to 
hospice, it was 10,100.  Hemoglobin and hematocrit were fairly stable at 9.3 and
29.9.  Platelet count 200,000.  Differential did show a persistent left shift 
with little improvement through hospitalization.  PT 11, PTT 29.9.  Chemistries 
on admission showed sodium 132, calcium 5.7, chloride 100, BUN 38, creatinine 
1.55.  Lactic acid was 3.1 initially.  After fluids, it went down to 1.6.  
Magnesium showed persistent low levels at 1.6, but responded to treatment to 
2.0.  BNP was elevated on admission at 460.  His potassium remained persistently
high and at discharge was 6.3.  Sodium normalized to 135.  Creatinine continued 
to increase and at discharge was 3.02.  Urinalysis showed trace intact blood 
with small amount of leukocyte esterase on admission.  After fluid replacement, 
showed trace intact blood, otherwise within normal limits.  Toxicology showed 
digoxin level 1.3. 



MICROBIOLOGY:  Urine culture final results showed a mixed urogenital ani.  
Blood cultures remained negative at 4 days.



RADIOLOGY:  Initial x-ray in the Emergency Room showed interval development of 
interstitial infiltrate in left upper lobe, subtle fullness possibly slight 
retraction of the left hilum superiorly.  Prominent central 
vasculature/pulmonary artery on the right, perihilar peribronchial thickening 
which may relate to bronchitis.  He had multiple x-rays through his admission 
and final x-ray on discharge despite of aggressive treatment with antibiotics 
showed unchanged dense left upper lung consolidation, worsening bibasilar patchy
opacities with small pleural effusion.  



HOSPITAL COURSE: Mr. Salvador was admitted for sepsis due to pneumonia on 01/09/19.
 He was treated aggressively with antibiotics that included Levaquin, Zyvox and 
cefepime.  Despite aggressive treatment, his renal function declined, he became 
more hyperkalemic.  He was not responding to the antibiotic treatment and was 
showing worsening respiratory efforts and was maintaining low O2 saturations 
even with a Ventimask.  His vital signs on admission showed temperature 101, 
heart rate 141, blood pressure 114/57, respirations 36, short of breath, oxygen 
saturation 95% on 3 liters nasal cannula.  At discharge, temperature was 97.6, 
pulse 121, blood pressure 93/59, respirations 28, oxygen saturation 91% on high 
flow Ventimask.  After lengthy discussion with family present, the family had 
come to the agreement that they had made aggressive treatment choices and had 
tried everything possible and felt like the patient would not agree with the 
current plan and would rather go to care and comfort measures given his severity
of disease process.  [facility] was then consulted for inpatient hospice.  



PLAN:  The patient was to be discharged from Acute Care to hospice, however, the
patient  shortly after discontinuing all regular medications and 
continued to be on Acute Care prior to expiration.  Therefore, he was actually 
on Acute Care when he .  The patient  at 13:36 with family 
bedside.  He had been having care and comfort measures initiated shortly after 
consultation with hospice in the process of trying transferring the patient to 
hospice care.  However, the patient succumbed to his illness and was pronounced.
 Death was confirmed by lack of ausculatory evidence of cardiac activity and 
respiratory effort.  The family was a bedside and very appreciative of the care 
that the patient was given.  The wishes of the family and the patient was that 
the body was to be donated to science, which will be handled by nursing staff.  
The patient's death was not unexpected as his initial admission for pneumonia 
was certainly critical and he was not very responsive to treatment, in fact, he 
did go ultimately go into acute renal failure with hyperkalemia.  Again, the 
family was very appreciative of the care and support given by hospital staff.  
Dr. Ni, his primary care physician, was notified of the patient's death.



DISPOSITION:  The patient  and the body was released to [facility] for 
donation to science.  



#[id number]; #[id number]

[facility]